# Patient Record
Sex: FEMALE | Race: WHITE | NOT HISPANIC OR LATINO | Employment: UNEMPLOYED | URBAN - METROPOLITAN AREA
[De-identification: names, ages, dates, MRNs, and addresses within clinical notes are randomized per-mention and may not be internally consistent; named-entity substitution may affect disease eponyms.]

---

## 2021-04-16 ENCOUNTER — OFFICE VISIT (OUTPATIENT)
Dept: FAMILY MEDICINE CLINIC | Facility: CLINIC | Age: 41
End: 2021-04-16

## 2021-04-16 VITALS
DIASTOLIC BLOOD PRESSURE: 72 MMHG | RESPIRATION RATE: 14 BRPM | TEMPERATURE: 98 F | HEART RATE: 80 BPM | SYSTOLIC BLOOD PRESSURE: 122 MMHG | HEIGHT: 67 IN | WEIGHT: 124 LBS | BODY MASS INDEX: 19.46 KG/M2 | OXYGEN SATURATION: 98 %

## 2021-04-16 DIAGNOSIS — F41.9 ANXIETY: Primary | ICD-10-CM

## 2021-04-16 DIAGNOSIS — G62.9 NEUROPATHY: ICD-10-CM

## 2021-04-16 DIAGNOSIS — Z13.220 SCREENING FOR HYPERLIPIDEMIA: ICD-10-CM

## 2021-04-16 DIAGNOSIS — F33.1 MODERATE EPISODE OF RECURRENT MAJOR DEPRESSIVE DISORDER (HCC): ICD-10-CM

## 2021-04-16 DIAGNOSIS — Z11.59 NEED FOR HEPATITIS C SCREENING TEST: ICD-10-CM

## 2021-04-16 PROBLEM — G43.909 MIGRAINE: Status: ACTIVE | Noted: 2021-04-16

## 2021-04-16 PROCEDURE — 99204 OFFICE O/P NEW MOD 45 MIN: CPT | Performed by: FAMILY MEDICINE

## 2021-04-16 RX ORDER — SERTRALINE HYDROCHLORIDE 25 MG/1
25 TABLET, FILM COATED ORAL DAILY
Qty: 90 TABLET | Refills: 1 | Status: SHIPPED | OUTPATIENT
Start: 2021-04-16 | End: 2021-05-14 | Stop reason: SDUPTHER

## 2021-04-16 RX ORDER — HYDROXYZINE HYDROCHLORIDE 25 MG/1
12.5-25 TABLET, FILM COATED ORAL EVERY 6 HOURS PRN
Qty: 30 TABLET | Refills: 2 | Status: SHIPPED | OUTPATIENT
Start: 2021-04-16

## 2021-04-16 RX ORDER — MULTIPLE VITAMINS W/ MINERALS TAB 9MG-400MCG
1 TAB ORAL DAILY
COMMUNITY
End: 2021-08-09
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
## 2021-04-23 ENCOUNTER — LAB (OUTPATIENT)
Dept: FAMILY MEDICINE CLINIC | Facility: CLINIC | Age: 41
End: 2021-04-23

## 2021-04-23 DIAGNOSIS — F41.9 ANXIETY: ICD-10-CM

## 2021-04-23 DIAGNOSIS — Z11.59 NEED FOR HEPATITIS C SCREENING TEST: ICD-10-CM

## 2021-04-23 DIAGNOSIS — G62.9 NEUROPATHY: ICD-10-CM

## 2021-04-23 DIAGNOSIS — Z13.220 SCREENING FOR HYPERLIPIDEMIA: ICD-10-CM

## 2021-04-24 LAB
ALBUMIN SERPL-MCNC: 4.5 G/DL (ref 3.5–5.2)
ALBUMIN/GLOB SERPL: 1.7 G/DL
ALP SERPL-CCNC: 31 U/L (ref 39–117)
ALT SERPL-CCNC: 17 U/L (ref 1–33)
AST SERPL-CCNC: 15 U/L (ref 1–32)
BASOPHILS # BLD AUTO: 0.02 10*3/MM3 (ref 0–0.2)
BASOPHILS NFR BLD AUTO: 0.3 % (ref 0–1.5)
BILIRUB SERPL-MCNC: 0.7 MG/DL (ref 0–1.2)
BUN SERPL-MCNC: 12 MG/DL (ref 6–20)
BUN/CREAT SERPL: 15.6 (ref 7–25)
CALCIUM SERPL-MCNC: 9.8 MG/DL (ref 8.6–10.5)
CHLORIDE SERPL-SCNC: 105 MMOL/L (ref 98–107)
CHOLEST SERPL-MCNC: 135 MG/DL (ref 0–200)
CHOLEST/HDLC SERPL: 1.67 {RATIO}
CO2 SERPL-SCNC: 25.3 MMOL/L (ref 22–29)
CREAT SERPL-MCNC: 0.77 MG/DL (ref 0.57–1)
EOSINOPHIL # BLD AUTO: 0.32 10*3/MM3 (ref 0–0.4)
EOSINOPHIL NFR BLD AUTO: 5.5 % (ref 0.3–6.2)
ERYTHROCYTE [DISTWIDTH] IN BLOOD BY AUTOMATED COUNT: 15 % (ref 12.3–15.4)
GLOBULIN SER CALC-MCNC: 2.6 GM/DL
GLUCOSE SERPL-MCNC: 80 MG/DL (ref 65–99)
HCT VFR BLD AUTO: 40.1 % (ref 34–46.6)
HCV AB S/CO SERPL IA: <0.1 S/CO RATIO (ref 0–0.9)
HDLC SERPL-MCNC: 81 MG/DL (ref 40–60)
HGB BLD-MCNC: 13.1 G/DL (ref 12–15.9)
IMM GRANULOCYTES # BLD AUTO: 0.02 10*3/MM3 (ref 0–0.05)
IMM GRANULOCYTES NFR BLD AUTO: 0.3 % (ref 0–0.5)
LDLC SERPL CALC-MCNC: 44 MG/DL (ref 0–100)
LYMPHOCYTES # BLD AUTO: 1.04 10*3/MM3 (ref 0.7–3.1)
LYMPHOCYTES NFR BLD AUTO: 17.9 % (ref 19.6–45.3)
MCH RBC QN AUTO: 30.2 PG (ref 26.6–33)
MCHC RBC AUTO-ENTMCNC: 32.7 G/DL (ref 31.5–35.7)
MCV RBC AUTO: 92.4 FL (ref 79–97)
MONOCYTES # BLD AUTO: 0.66 10*3/MM3 (ref 0.1–0.9)
MONOCYTES NFR BLD AUTO: 11.4 % (ref 5–12)
NEUTROPHILS # BLD AUTO: 3.75 10*3/MM3 (ref 1.7–7)
NEUTROPHILS NFR BLD AUTO: 64.6 % (ref 42.7–76)
NRBC BLD AUTO-RTO: 0 /100 WBC (ref 0–0.2)
PLATELET # BLD AUTO: 216 10*3/MM3 (ref 140–450)
POTASSIUM SERPL-SCNC: 4.8 MMOL/L (ref 3.5–5.2)
PROT SERPL-MCNC: 7.1 G/DL (ref 6–8.5)
RBC # BLD AUTO: 4.34 10*6/MM3 (ref 3.77–5.28)
SODIUM SERPL-SCNC: 139 MMOL/L (ref 136–145)
TRIGL SERPL-MCNC: 40 MG/DL (ref 0–150)
TSH SERPL DL<=0.005 MIU/L-ACNC: 1.87 UIU/ML (ref 0.27–4.2)
VIT B12 SERPL-MCNC: 976 PG/ML (ref 211–946)
VLDLC SERPL CALC-MCNC: 10 MG/DL (ref 5–40)
WBC # BLD AUTO: 5.81 10*3/MM3 (ref 3.4–10.8)

## 2021-04-26 ENCOUNTER — TELEPHONE (OUTPATIENT)
Dept: FAMILY MEDICINE CLINIC | Facility: CLINIC | Age: 41
End: 2021-04-26

## 2021-05-14 ENCOUNTER — OFFICE VISIT (OUTPATIENT)
Dept: FAMILY MEDICINE CLINIC | Facility: CLINIC | Age: 41
End: 2021-05-14

## 2021-05-14 VITALS
WEIGHT: 124 LBS | OXYGEN SATURATION: 99 % | HEART RATE: 76 BPM | DIASTOLIC BLOOD PRESSURE: 64 MMHG | RESPIRATION RATE: 14 BRPM | BODY MASS INDEX: 19.46 KG/M2 | SYSTOLIC BLOOD PRESSURE: 102 MMHG | TEMPERATURE: 98 F | HEIGHT: 67 IN

## 2021-05-14 DIAGNOSIS — R11.0 NAUSEA: ICD-10-CM

## 2021-05-14 DIAGNOSIS — F33.1 MODERATE EPISODE OF RECURRENT MAJOR DEPRESSIVE DISORDER (HCC): ICD-10-CM

## 2021-05-14 DIAGNOSIS — F41.9 ANXIETY: ICD-10-CM

## 2021-05-14 DIAGNOSIS — J06.9 ACUTE URI: Primary | ICD-10-CM

## 2021-05-14 DIAGNOSIS — R05.9 COUGH: ICD-10-CM

## 2021-05-14 LAB
EXPIRATION DATE: NORMAL
FLUAV AG NPH QL: NEGATIVE
FLUBV AG NPH QL: NEGATIVE
INTERNAL CONTROL: NORMAL
Lab: 2780

## 2021-05-14 PROCEDURE — 87804 INFLUENZA ASSAY W/OPTIC: CPT | Performed by: FAMILY MEDICINE

## 2021-05-14 PROCEDURE — 99214 OFFICE O/P EST MOD 30 MIN: CPT | Performed by: FAMILY MEDICINE

## 2021-05-14 RX ORDER — DEXTROMETHORPHAN HYDROBROMIDE AND PROMETHAZINE HYDROCHLORIDE 15; 6.25 MG/5ML; MG/5ML
5 SYRUP ORAL 4 TIMES DAILY PRN
Qty: 180 ML | Refills: 0 | Status: SHIPPED | OUTPATIENT
Start: 2021-05-14 | End: 2021-08-09

## 2021-05-14 RX ORDER — ONDANSETRON 4 MG/1
4 TABLET, ORALLY DISINTEGRATING ORAL EVERY 8 HOURS PRN
Qty: 20 TABLET | Refills: 0 | Status: SHIPPED | OUTPATIENT
Start: 2021-05-14 | End: 2021-08-09

## 2021-05-14 NOTE — PATIENT INSTRUCTIONS
Go to the nearest ER or return to clinic if symptoms worsen, fever/chill develop  Sertraline tablets  What is this medicine?  SERTRALINE (SER tra elizabeth) is used to treat depression. It may also be used to treat obsessive compulsive disorder, panic disorder, post-trauma stress, premenstrual dysphoric disorder (PMDD) or social anxiety.  This medicine may be used for other purposes; ask your health care provider or pharmacist if you have questions.  COMMON BRAND NAME(S): Zoloft  What should I tell my health care provider before I take this medicine?  They need to know if you have any of these conditions:  · bleeding disorders  · bipolar disorder or a family history of bipolar disorder  · glaucoma  · heart disease  · high blood pressure  · history of irregular heartbeat  · history of low levels of calcium, magnesium, or potassium in the blood  · if you often drink alcohol  · liver disease  · receiving electroconvulsive therapy  · seizures  · suicidal thoughts, plans, or attempt; a previous suicide attempt by you or a family member  · take medicines that treat or prevent blood clots  · thyroid disease  · an unusual or allergic reaction to sertraline, other medicines, foods, dyes, or preservatives  · pregnant or trying to get pregnant  · breast-feeding  How should I use this medicine?  Take this medicine by mouth with a glass of water. Follow the directions on the prescription label. You can take it with or without food. Take your medicine at regular intervals. Do not take your medicine more often than directed. Do not stop taking this medicine suddenly except upon the advice of your doctor. Stopping this medicine too quickly may cause serious side effects or your condition may worsen.  A special MedGuide will be given to you by the pharmacist with each prescription and refill. Be sure to read this information carefully each time.  Talk to your pediatrician regarding the use of this medicine in children. While this drug  may be prescribed for children as young as 7 years for selected conditions, precautions do apply.  Overdosage: If you think you have taken too much of this medicine contact a poison control center or emergency room at once.  NOTE: This medicine is only for you. Do not share this medicine with others.  What if I miss a dose?  If you miss a dose, take it as soon as you can. If it is almost time for your next dose, take only that dose. Do not take double or extra doses.  What may interact with this medicine?  Do not take this medicine with any of the following medications:  · cisapride  · dronedarone  · linezolid  · MAOIs like Carbex, Eldepryl, Marplan, Nardil, and Parnate  · methylene blue (injected into a vein)  · pimozide  · thioridazine  This medicine may also interact with the following medications:  · alcohol  · amphetamines  · aspirin and aspirin-like medicines  · certain medicines for depression, anxiety, or psychotic disturbances  · certain medicines for fungal infections like ketoconazole, fluconazole, posaconazole, and itraconazole  · certain medicines for irregular heart beat like flecainide, quinidine, propafenone  · certain medicines for migraine headaches like almotriptan, eletriptan, frovatriptan, naratriptan, rizatriptan, sumatriptan, zolmitriptan  · certain medicines for sleep  · certain medicines for seizures like carbamazepine, valproic acid, phenytoin  · certain medicines that treat or prevent blood clots like warfarin, enoxaparin, dalteparin  · cimetidine  · digoxin  · diuretics  · fentanyl  · isoniazid  · lithium  · NSAIDs, medicines for pain and inflammation, like ibuprofen or naproxen  · other medicines that prolong the QT interval (cause an abnormal heart rhythm) like dofetilide  · rasagiline  · safinamide  · supplements like Terry's wort, kava kava, valerian  · tolbutamide  · tramadol  · tryptophan  This list may not describe all possible interactions. Give your health care provider a list  of all the medicines, herbs, non-prescription drugs, or dietary supplements you use. Also tell them if you smoke, drink alcohol, or use illegal drugs. Some items may interact with your medicine.  What should I watch for while using this medicine?  Tell your doctor if your symptoms do not get better or if they get worse. Visit your doctor or health care professional for regular checks on your progress. Because it may take several weeks to see the full effects of this medicine, it is important to continue your treatment as prescribed by your doctor.  Patients and their families should watch out for new or worsening thoughts of suicide or depression. Also watch out for sudden changes in feelings such as feeling anxious, agitated, panicky, irritable, hostile, aggressive, impulsive, severely restless, overly excited and hyperactive, or not being able to sleep. If this happens, especially at the beginning of treatment or after a change in dose, call your health care professional.  You may get drowsy or dizzy. Do not drive, use machinery, or do anything that needs mental alertness until you know how this medicine affects you. Do not stand or sit up quickly, especially if you are an older patient. This reduces the risk of dizzy or fainting spells. Alcohol may interfere with the effect of this medicine. Avoid alcoholic drinks.  Your mouth may get dry. Chewing sugarless gum or sucking hard candy, and drinking plenty of water may help. Contact your doctor if the problem does not go away or is severe.  What side effects may I notice from receiving this medicine?  Side effects that you should report to your doctor or health care professional as soon as possible:  · allergic reactions like skin rash, itching or hives, swelling of the face, lips, or tongue  · anxious  · black, tarry stools  · changes in vision  · confusion  · elevated mood, decreased need for sleep, racing thoughts, impulsive behavior  · eye pain  · fast,  irregular heartbeat  · feeling faint or lightheaded, falls  · feeling agitated, angry, or irritable  · hallucination, loss of contact with reality  · loss of balance or coordination  · loss of memory  · painful or prolonged erections  · restlessness, pacing, inability to keep still  · seizures  · stiff muscles  · suicidal thoughts or other mood changes  · trouble sleeping  · unusual bleeding or bruising  · unusually weak or tired  · vomiting  Side effects that usually do not require medical attention (report to your doctor or health care professional if they continue or are bothersome):  · change in appetite or weight  · change in sex drive or performance  · diarrhea  · increased sweating  · indigestion, nausea  · tremors  This list may not describe all possible side effects. Call your doctor for medical advice about side effects. You may report side effects to FDA at 9-873-FDA-7967.  Where should I keep my medicine?  Keep out of the reach of children.  Store at room temperature between 15 and 30 degrees C (59 and 86 degrees F). Throw away any unused medicine after the expiration date.  NOTE: This sheet is a summary. It may not cover all possible information. If you have questions about this medicine, talk to your doctor, pharmacist, or health care provider.  © 2021 [employer]vier/Gold Standard (2019-12-10 10:09:27)

## 2021-05-14 NOTE — PROGRESS NOTES
"Chief Complaint  1mo f/u Zoloft ('working but not like I want it to' ) and Congestion, voice comes/goes (chills w/ vomiting Monday-work wants her tested for Covid)    Subjective          Brenda Parsons presents to [facility]  URI   This is a new problem. The current episode started in the past 7 days (4 days). The problem has been unchanged. Associated symptoms include congestion, coughing (dry), headaches (sinus), nausea and vomiting. Associated symptoms comments: Chills, voice hoarseness, altered taste  . She has tried NSAIDs for the symptoms. The treatment provided no relief.   Vomiting has stopped, but nausea remains    Anxiety and depression  Chronic conditions, recently started Sertraline to treat. Since starting sertraline, she has noticed improvement of anxiety and mood. However, still has both anxiety and depression symptoms and not completely controlled. Still cries at times and has to be encouraged to leave home or do things with her boyfriend leaving their residence.   There have not be any negative side effects associated with sertraline.          The following portions of the patient's history were reviewed and updated as appropriate: allergies, current medications, past family history, past medical history, past social history, past surgical history and problem list.    Objective   Vital Signs:   /64   Pulse 76   Temp 98 °F (36.7 °C)   Resp 14   Ht 170.2 cm (67\")   Wt 56.2 kg (124 lb)   SpO2 99%   BMI 19.42 kg/m²     Physical Exam  Vitals and nursing note reviewed.   Constitutional:       Appearance: She is well-developed.   HENT:      Head: Normocephalic and atraumatic.      Right Ear: Hearing, tympanic membrane, ear canal and external ear normal.      Left Ear: Hearing, tympanic membrane, ear canal and external ear normal.      Nose: Nose normal.      Mouth/Throat:      Lips: Pink.      Mouth: Mucous membranes are moist.      Pharynx: Oropharynx is " clear. No oropharyngeal exudate or posterior oropharyngeal erythema.   Eyes:      Conjunctiva/sclera: Conjunctivae normal.   Cardiovascular:      Rate and Rhythm: Normal rate and regular rhythm.      Heart sounds: Normal heart sounds. No murmur heard.     Pulmonary:      Effort: Pulmonary effort is normal.      Breath sounds: Normal breath sounds. No wheezing.   Musculoskeletal:         General: No deformity.      Cervical back: Neck supple.   Skin:     General: Skin is warm and dry.   Neurological:      General: No focal deficit present.      Mental Status: She is alert and oriented to person, place, and time.   Psychiatric:         Mood and Affect: Mood normal.         Behavior: Behavior normal.         Thought Content: Thought content normal.        Result Review :                 Assessment and Plan    Diagnoses and all orders for this visit:    1. Acute URI (Primary)  Comments:  No evidence of bacterial infection. Influenza A/B negative. Covid19 screening is pending.   Orders:  -     COVID-19,[facility] ROUTINE, NP/OP SWAB IN TRANSPORT MEDIA OR ESWAB 72 HR TAT - Swab, Nasopharynx  -     POCT Influenza A/B    2. Nausea  -     promethazine-dextromethorphan (PROMETHAZINE-DM) 6.25-15 MG/5ML syrup; Take 5 mL by mouth 4 (Four) Times a Day As Needed for Cough.  Dispense: 180 mL; Refill: 0  -     ondansetron ODT (Zofran ODT) 4 MG disintegrating tablet; Place 1 tablet on the tongue Every 8 (Eight) Hours As Needed for Nausea or Vomiting.  Dispense: 20 tablet; Refill: 0    3. Cough  -     promethazine-dextromethorphan (PROMETHAZINE-DM) 6.25-15 MG/5ML syrup; Take 5 mL by mouth 4 (Four) Times a Day As Needed for Cough.  Dispense: 180 mL; Refill: 0    4. Anxiety  Comments:  Increase sertraline to 50 mg daily to improve anxiety and depression. She has already seen improvement of symptoms with treatment.   Orders:  -     sertraline (Zoloft) 50 MG tablet; Take 1 tablet by mouth Daily.  Dispense: 30 tablet; Refill: 1    5. Moderate  episode of recurrent major depressive disorder (CMS/HCC)  -     sertraline (Zoloft) 50 MG tablet; Take 1 tablet by mouth Daily.  Dispense: 30 tablet; Refill: 1        Follow Up   Return in about 3 months (around 8/14/2021).  Patient was given instructions and counseling regarding her condition or for health maintenance advice. Please see specific information pulled into the AVS if appropriate.

## 2021-05-27 ENCOUNTER — APPOINTMENT (OUTPATIENT)
Dept: NEUROLOGY | Facility: HOSPITAL | Age: 41
End: 2021-05-27

## 2021-05-27 ENCOUNTER — HOSPITAL ENCOUNTER (OUTPATIENT)
Dept: NEUROLOGY | Facility: HOSPITAL | Age: 41
Discharge: HOME | End: 2021-05-27
Admitting: FAMILY MEDICINE

## 2021-05-27 PROCEDURE — 95913 NRV CNDJ TEST 13/> STUDIES: CPT

## 2021-05-27 PROCEDURE — 95886 MUSC TEST DONE W/N TEST COMP: CPT

## 2021-05-31 DIAGNOSIS — G56.21 ULNAR NEUROPATHY AT ELBOW OF RIGHT UPPER EXTREMITY: Primary | ICD-10-CM

## 2021-06-03 DIAGNOSIS — R20.2 PARESTHESIA OF BOTH FEET: Primary | ICD-10-CM

## 2021-06-11 DIAGNOSIS — F41.9 ANXIETY: ICD-10-CM

## 2021-06-11 DIAGNOSIS — F33.1 MODERATE EPISODE OF RECURRENT MAJOR DEPRESSIVE DISORDER (HCC): ICD-10-CM

## 2021-06-17 ENCOUNTER — TELEPHONE (OUTPATIENT)
Dept: FAMILY MEDICINE CLINIC | Facility: CLINIC | Age: 41
End: 2021-06-17

## 2021-06-17 NOTE — TELEPHONE ENCOUNTER
Request for Information Form complete. Does pt want to p/u or have us fax it?     Lvm for pt. HUB CAN ASK please.

## 2021-06-24 ENCOUNTER — OFFICE VISIT (OUTPATIENT)
Dept: ORTHOPEDIC SURGERY | Facility: CLINIC | Age: 41
End: 2021-06-24

## 2021-06-24 VITALS
DIASTOLIC BLOOD PRESSURE: 78 MMHG | WEIGHT: 112 LBS | BODY MASS INDEX: 17.58 KG/M2 | HEIGHT: 67 IN | HEART RATE: 82 BPM | SYSTOLIC BLOOD PRESSURE: 126 MMHG

## 2021-06-24 DIAGNOSIS — G56.21 ULNAR NEUROPATHY AT ELBOW, RIGHT: Primary | ICD-10-CM

## 2021-06-24 PROCEDURE — 99204 OFFICE O/P NEW MOD 45 MIN: CPT | Performed by: PHYSICIAN ASSISTANT

## 2021-06-24 RX ORDER — ACETAMINOPHEN 500 MG
500 TABLET ORAL AS NEEDED
COMMUNITY
End: 2021-08-09

## 2021-06-24 RX ORDER — IBUPROFEN 200 MG
200 TABLET ORAL AS NEEDED
COMMUNITY
End: 2021-08-09

## 2021-06-24 RX ORDER — MELOXICAM 15 MG/1
15 TABLET ORAL DAILY
Qty: 30 TABLET | Refills: 2 | Status: SHIPPED | OUTPATIENT
Start: 2021-06-24 | End: 2021-08-09

## 2021-06-24 NOTE — PROGRESS NOTES
[facility]    Subjective     Patient: Brenda Parsons  : 1980    Primary Care Provider: Alice Batista DO    Requesting Provider: As above    Pain of the Right Elbow      History    Chief Complaint: Right elbow pain with hand numbness and tingling    History of Present Illness: This very pleasant 40-year-old female presenting today to discuss her right elbow pain and hand numbness and tingling for 2 to 3 years intermittently.  She is right-hand dominant.  She denies any trauma or injury.  She reports symptoms in the ring and pinky finger and sometimes in the long finger.  It bothers her more at work and in [employer].  It sometimes wakes her at night.  She has treated with ibuprofen.  She had EMG on 2021 here.  She reports the same but milder symptoms on the left. here for further evaluation treatment recommendations.    Current Outpatient Medications on File Prior to Visit   Medication Sig Dispense Refill   • acetaminophen (TYLENOL) 500 MG tablet Take 500 mg by mouth As Needed for Mild Pain .     • Aspirin-Acetaminophen-Caffeine (EXCEDRIN MIGRAINE PO) Take  by mouth As Needed.     • hydrOXYzine (ATARAX) 25 MG tablet Take 0.5-1 tablets by mouth Every 6 (Six) Hours As Needed for Anxiety. 30 tablet 2   • ibuprofen (ADVIL,MOTRIN) 200 MG tablet Take 200 mg by mouth As Needed for Mild Pain .     • multivitamin with minerals (MULTIVITAMIN ADULT PO) Take 1 tablet by mouth Daily.     • ondansetron ODT (Zofran ODT) 4 MG disintegrating tablet Place 1 tablet on the tongue Every 8 (Eight) Hours As Needed for Nausea or Vomiting. 20 tablet 0   • promethazine-dextromethorphan (PROMETHAZINE-DM) 6.25-15 MG/5ML syrup Take 5 mL by mouth 4 (Four) Times a Day As Needed for Cough. 180 mL 0   • sertraline (Zoloft) 50 MG tablet Take 1 tablet by mouth Daily. 90 tablet 1     No current facility-administered medications on file prior to visit.      Allergies   Allergen Reactions   • Grass Itching       Past Medical History:   Diagnosis Date   • Back problem    • Bronchitis    • Claustrophobia    • Depression    • Migraine    • Pneumonia    • Shingles      Past Surgical History:   Procedure Laterality Date   • ADENOIDECTOMY     • GYNECOLOGIC CRYOSURGERY     • INDUCED      • TONSILLECTOMY     • WISDOM TOOTH EXTRACTION       Family History   Problem Relation Age of Onset   • Thyroid disease Mother    • Heart disease Father    • Hyperlipidemia Father    • Hypertension Father    • Mental illness Sister       Social History     Socioeconomic History   • Marital status: Single     Spouse name: Not on file   • Number of children: Not on file   • Years of education: Not on file   • Highest education level: Not on file   Tobacco Use   • Smoking status: Former Smoker     Packs/day: 0.25     Years: 16.00     Pack years: 4.00     Types: Cigarettes     Quit date:      Years since quittin.4   • Smokeless tobacco: Never Used   Vaping Use   • Vaping Use: Never used   Substance and Sexual Activity   • Alcohol use: Yes     Comment: 1-2 a week    • Drug use: Never   • Sexual activity: Defer     Comment:         Review of Systems   Constitutional: Positive for appetite change and unexpected weight change.   Eyes: Negative.    Respiratory: Positive for shortness of breath.    Cardiovascular: Negative.    Gastrointestinal: Negative.    Endocrine: Negative.    Genitourinary: Negative.    Musculoskeletal: Positive for arthralgias and joint swelling.   Skin: Negative.    Allergic/Immunologic: Negative.    Neurological: Positive for dizziness, tremors, light-headedness, numbness (Bilat hands) and headaches.   Hematological: Negative.    Psychiatric/Behavioral: Positive for agitation, decreased concentration and sleep disturbance. The patient is nervous/anxious.        The following portions of the patient's history were reviewed and updated as appropriate: allergies, current medications, past family history,  "past medical history, past social history, past surgical history and problem list.      Objective      Physical Exam  /78   Pulse 82   Ht 170.2 cm (67.01\")   Wt 50.8 kg (112 lb)   BMI 17.54 kg/m²     Body mass index is 17.54 kg/m².    GENERAL: Body habitus: normal weight for height    Gait: normal     Mental Status:  awake and alert; oriented to person, place, and time    Voice:  clear  SKIN:  Normal    Hair Growth:  Right:normal; Left:  normal  HEENT: Head: Normocephalic, atraumatic,  without obvious abnormality.  eye: normal external eye, no icterus   PULM:  Repiratory effort normal    Ortho Exam   Peripheral Vascular   Right Upper Extremity    No cyanotic nail beds    Pink nail beds and rapid capillary refill   Palpation    Radial Pulse - Bilaterally normal    Neurologic   Sensory - Elbow   Inspection and Palpation:    Light touch: intact - right hand   Muscle Strength and Tone:    Right bicep - 5/5    Right tricep - 5/5    Right wrist extensors - 5/5     Right wrist flexors - 5/5    Right intrinsics - 5/5    Musculoskeletal   Right Upper Extremity - Elbow   Inspection and Palpation     Tenderness -nontender    Effusion - none    Crepitus - none   Range of Motion    Flexion: AROM -140 degrees    Extension - AROM - 0 degrees     Forearm supination: AROM - 90 degrees    Forearm pronation - AROM - 90 degrees    Deformities/Malalignments/Discepancies - non   Functional testing:    Valgus stress test negative    Varus stress test negative    Elbow flexion test negative    Tinel's sign at Cubital tunnel positive peripheral Vascular   Left Upper Extremity    No cyanotic nail beds    Pink nail beds and rapid capillary refill   Palpation    Radial Pulse - Bilaterally normal    Neurologic   Sensory - Elbow   Inspection and Palpation:    Light touch: intact - right hand   Muscle Strength and Tone:    Left bicep - 5/5    Left tricep - 5/5    Left wrist extensors - 5/5     Left wrist flexors - 5/5    Left intrinsics " - 5/5    Musculoskeletal   Left Upper Extremity - Elbow   Inspection and Palpation     Tenderness -nontender    Effusion - none    Crepitus - none   Range of Motion    Flexion: AROM - 140 degrees    Extension - AROM - 0 degrees     Forearm supination: AROM - 90 degrees    Forearm pronation - AROM - 90 degrees   Deformities/Malalignments/Discepancies - none   Functional testing:    Valgus stress test negative    Varus stress test negative    Elbow flexion test negative    Tinel's sign at Cubital tunnel positive      Medical Decision Making    Data Review:   Reviewed right upper extremity EMG from 5/27/2021    Assessment:  1. Ulnar neuropathy at elbow, right        Plan:  Right mild ulnar neuropathy.  I reviewed EMG from 5/27/2021 and clinical findings with the patient.  On exam, she has positive Tinel's at the elbow with normal range of motion and strength.  EMG shows mild right ulnar neuropathy.  We discussed treatment including night splinting straight as well as of regular oral anti-inflammatory.  I also recommended a topical anti-inflammatory.  Last resort, would be surgical ulnar nerve transposition.  I have given her a picture of a night splint to get to begin using.  I given a prescription for meloxicam.  She will return to see me in 6 to 8 weeks or sooner if needed.    History, diagnosis and treatment plan discussed with Dr. Sims.          Phuong Guadalupe PA-C  06/24/21  13:59 EDT

## 2021-07-23 DIAGNOSIS — F41.9 ANXIETY: ICD-10-CM

## 2021-07-23 DIAGNOSIS — F33.1 MODERATE EPISODE OF RECURRENT MAJOR DEPRESSIVE DISORDER (HCC): ICD-10-CM

## 2021-08-09 ENCOUNTER — OFFICE VISIT (OUTPATIENT)
Dept: FAMILY MEDICINE CLINIC | Facility: CLINIC | Age: 41
End: 2021-08-09

## 2021-08-09 VITALS
HEART RATE: 76 BPM | WEIGHT: 117 LBS | BODY MASS INDEX: 18.36 KG/M2 | RESPIRATION RATE: 16 BRPM | SYSTOLIC BLOOD PRESSURE: 112 MMHG | TEMPERATURE: 97.7 F | DIASTOLIC BLOOD PRESSURE: 66 MMHG | OXYGEN SATURATION: 99 % | HEIGHT: 67 IN

## 2021-08-09 DIAGNOSIS — R20.2 PARESTHESIA AND PAIN OF BOTH UPPER EXTREMITIES: ICD-10-CM

## 2021-08-09 DIAGNOSIS — M79.602 PARESTHESIA AND PAIN OF BOTH UPPER EXTREMITIES: ICD-10-CM

## 2021-08-09 DIAGNOSIS — F41.9 ANXIETY: ICD-10-CM

## 2021-08-09 DIAGNOSIS — R20.2 PARESTHESIA OF BOTH LOWER EXTREMITIES: ICD-10-CM

## 2021-08-09 DIAGNOSIS — F33.1 MODERATE EPISODE OF RECURRENT MAJOR DEPRESSIVE DISORDER (HCC): Primary | ICD-10-CM

## 2021-08-09 DIAGNOSIS — M79.601 PARESTHESIA AND PAIN OF BOTH UPPER EXTREMITIES: ICD-10-CM

## 2021-08-09 DIAGNOSIS — R19.7 DIARRHEA, UNSPECIFIED TYPE: ICD-10-CM

## 2021-08-09 PROCEDURE — 99214 OFFICE O/P EST MOD 30 MIN: CPT | Performed by: FAMILY MEDICINE

## 2021-08-09 RX ORDER — VENLAFAXINE HYDROCHLORIDE 75 MG/1
75 CAPSULE, EXTENDED RELEASE ORAL DAILY
Qty: 30 CAPSULE | Refills: 2 | Status: SHIPPED | OUTPATIENT
Start: 2021-08-09

## 2021-08-09 RX ORDER — BUSPIRONE HYDROCHLORIDE 7.5 MG/1
7.5 TABLET ORAL 2 TIMES DAILY PRN
Qty: 60 TABLET | Refills: 1 | Status: SHIPPED | OUTPATIENT
Start: 2021-08-09

## 2021-08-09 NOTE — PROGRESS NOTES
"Chief Complaint  3mo f/u anxiety; Diarrhea after eating (Tried BRAT diet, didn't help ); and More snappy, increase Zoloft? (Really bad crying spells)    Subjective          Brenda Parsons presents to [facility]  History of Present Illness  Anxiety and Depression  Chronic condition, currently treated with sertraline.   Stopped working at [employer] because it was making anxiety worse.  At this time, she is not working due to anxiety and depression.  Finds herself more emotional and irritable.  Sertraline was initially helping, but over the last 2 weeks, anxiety has worsened.   Having panic attacks often. Hydroxyzine is not effective with panic attacks, takes too long to work.     Diarrhea associated with eating, worse within the last 2 weeks.  No specific food makes symptoms worse, occurs with all foods.  Abdominal cramping present  Anxiety does make diarrhea worse.     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  She did see orthopedic office for management of this.  She is scheduled with neurology in the near future for further evaluation.  No prior imaging of brain.      The following portions of the patient's history were reviewed and updated as appropriate: allergies, current medications, past family history, past medical history, past social history, past surgical history and problem list.    Objective   Vital Signs:   /66   Pulse 76   Temp 97.7 °F (36.5 °C)   Resp 16   Ht 170.2 cm (67\")   Wt 53.1 kg (117 lb)   SpO2 99%   BMI 18.32 kg/m²     Physical Exam  Vitals and nursing note reviewed.   Constitutional:       Appearance: She is well-developed.   HENT:      Head: Normocephalic and atraumatic.      Right Ear: External ear normal.      Left Ear: External ear normal.      Nose: Nose normal.   Eyes:      Conjunctiva/sclera: Conjunctivae normal.   Cardiovascular:      Rate " and Rhythm: Normal rate and regular rhythm.      Heart sounds: Normal heart sounds. No murmur heard.     Pulmonary:      Effort: Pulmonary effort is normal.      Breath sounds: Normal breath sounds.   Abdominal:      Palpations: Abdomen is soft.      Tenderness: There is abdominal tenderness in the right upper quadrant. There is no guarding. Positive signs include Esteban's sign.   Musculoskeletal:         General: No deformity.      Cervical back: Neck supple.   Skin:     General: Skin is warm and dry.   Neurological:      Mental Status: She is alert and oriented to person, place, and time.   Psychiatric:         Mood and Affect: Mood is anxious. Affect is flat.         Behavior: Behavior normal.         Thought Content: Thought content normal.        Result Review :                 Assessment and Plan    Diagnoses and all orders for this visit:    1. Moderate episode of recurrent major depressive disorder (CMS/HCC) (Primary)  -     venlafaxine XR (Effexor XR) 75 MG 24 hr capsule; Take 1 capsule by mouth Daily.  Dispense: 30 capsule; Refill: 2  -     Ambulatory Referral to Psychiatry    2. Anxiety  -     venlafaxine XR (Effexor XR) 75 MG 24 hr capsule; Take 1 capsule by mouth Daily.  Dispense: 30 capsule; Refill: 2  -     busPIRone (BUSPAR) 7.5 MG tablet; Take 1 tablet by mouth 2 (Two) Times a Day As Needed (anixety).  Dispense: 60 tablet; Refill: 1  -     Ambulatory Referral to Psychiatry    3. Paresthesia and pain of both upper extremities  -     MRI Brain With & Without Contrast    4. Paresthesia of both lower extremities  -     MRI Brain With & Without Contrast    5. Diarrhea, unspecified type  -     US Gallbladder; Future    Change sertraline to venlafaxine for treatment of anxiety and depression.  Hydroxyzine was not effective for her, will use buspirone as needed for breakthrough anxiety.  She would like to establish care with psychiatry and counselor, referral placed.  MRI brain to evaluate paresthesias of  upper and lower extremities.  Keep appointment with neurology.  Ultrasound of gallbladder to evaluate diarrhea associated with eating.  If this is normal, will refer to gastroenterology for further evaluation.    Follow Up   Return in about 6 weeks (around 9/20/2021) for Annual with pap smear .  Patient was given instructions and counseling regarding her condition or for health maintenance advice. Please see specific information pulled into the AVS if appropriate.

## 2021-09-03 ENCOUNTER — HOSPITAL ENCOUNTER (OUTPATIENT)
Dept: ULTRASOUND IMAGING | Facility: HOSPITAL | Age: 41
Discharge: HOME | End: 2021-09-03

## 2021-09-03 ENCOUNTER — HOSPITAL ENCOUNTER (OUTPATIENT)
Dept: MRI IMAGING | Facility: HOSPITAL | Age: 41
Discharge: HOME | End: 2021-09-03

## 2021-09-03 DIAGNOSIS — R19.7 DIARRHEA, UNSPECIFIED TYPE: ICD-10-CM

## 2021-09-03 PROCEDURE — 70551 MRI BRAIN STEM W/O DYE: CPT

## 2021-09-03 PROCEDURE — 76705 ECHO EXAM OF ABDOMEN: CPT

## 2021-09-16 ENCOUNTER — OFFICE VISIT (OUTPATIENT)
Dept: NEUROLOGY | Facility: CLINIC | Age: 41
End: 2021-09-16

## 2021-09-16 ENCOUNTER — LAB (OUTPATIENT)
Dept: LAB | Facility: HOSPITAL | Age: 41
End: 2021-09-16

## 2021-09-16 VITALS
WEIGHT: 114 LBS | SYSTOLIC BLOOD PRESSURE: 100 MMHG | BODY MASS INDEX: 17.89 KG/M2 | HEART RATE: 70 BPM | DIASTOLIC BLOOD PRESSURE: 62 MMHG | OXYGEN SATURATION: 97 % | HEIGHT: 67 IN

## 2021-09-16 DIAGNOSIS — R20.2 PARESTHESIAS: ICD-10-CM

## 2021-09-16 DIAGNOSIS — M54.17 LUMBOSACRAL RADICULOPATHY: ICD-10-CM

## 2021-09-16 DIAGNOSIS — G56.21 ULNAR NEUROPATHY OF RIGHT UPPER EXTREMITY: Primary | ICD-10-CM

## 2021-09-16 DIAGNOSIS — G43.109 MIGRAINE WITH AURA AND WITHOUT STATUS MIGRAINOSUS, NOT INTRACTABLE: ICD-10-CM

## 2021-09-16 PROCEDURE — 36415 COLL VENOUS BLD VENIPUNCTURE: CPT

## 2021-09-16 PROCEDURE — 86038 ANTINUCLEAR ANTIBODIES: CPT

## 2021-09-16 PROCEDURE — 83516 IMMUNOASSAY NONANTIBODY: CPT

## 2021-09-16 PROCEDURE — 84207 ASSAY OF VITAMIN B-6: CPT

## 2021-09-16 PROCEDURE — 86255 FLUORESCENT ANTIBODY SCREEN: CPT

## 2021-09-16 PROCEDURE — 84155 ASSAY OF PROTEIN SERUM: CPT

## 2021-09-16 PROCEDURE — 82728 ASSAY OF FERRITIN: CPT

## 2021-09-16 PROCEDURE — 84165 PROTEIN E-PHORESIS SERUM: CPT

## 2021-09-16 PROCEDURE — 99204 OFFICE O/P NEW MOD 45 MIN: CPT | Performed by: PSYCHIATRY & NEUROLOGY

## 2021-09-16 PROCEDURE — 82784 ASSAY IGA/IGD/IGG/IGM EACH: CPT

## 2021-09-16 PROCEDURE — 83036 HEMOGLOBIN GLYCOSYLATED A1C: CPT

## 2021-09-16 RX ORDER — RIZATRIPTAN BENZOATE 10 MG/1
TABLET ORAL
Qty: 8 TABLET | Refills: 3 | Status: SHIPPED | OUTPATIENT
Start: 2021-09-16

## 2021-09-16 RX ORDER — ROPINIROLE 0.5 MG/1
TABLET, FILM COATED ORAL
Qty: 30 TABLET | Refills: 2 | Status: SHIPPED | OUTPATIENT
Start: 2021-09-16

## 2021-09-16 NOTE — PROGRESS NOTES
Subjective:    CC: Brenda Botello is seen today in consultation at the request of Alice Batista DO for Paresthesia of Both Feet       HPI:  40-year-old female with a history of anxiety, depression, headaches presents with paresthesias.  As per patient she started having numbness along with a sensation of burning and pins-and-needles in the balls of her right foot about 1 year ago.  Prolonged standing makes the symptoms worse therefore after she quit her job at [employer] recently the symptoms have improved slightly.  Over time she has also had the symptoms in her left foot with numbness in the last 3 digits of both feet with her feet feeling cold.  Occasionally she gets sharp electric-like sensations in her legs and also jerks her legs at night.  Denies any history of diabetes or recent heavy alcohol intake (would occasionally binge drink in her 20s).  In addition she has low back pain that radiates down the back of her right leg.  She also developed numbness in the last 2 digits of her right hand a few months ago.  Today she also complains of frequent headaches that are preceded by auras where she sees flashes of light or starts to lose her peripheral vision.  The headaches are mostly one-sided accompanied by nausea and slurring of speech but no severe photophobia or phonophobia.  They are currently occurring about 3-4 times a month.  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  She also had an EMG/NCS in May that showed mild ulnar neuropathy on the right but no evidence of large fiber neuropathy or radiculopathy either in the right arm or right leg.  Has had a B12 level that was 976 and a normal TSH with normal hemoglobin and blood glucose.  Of note-I personally reviewed her MRI brain    The following portions of the patient's history were reviewed today and updated as of 09/16/2021  : allergies, current  medications, past family history, past medical history, past social history, past surgical history and problem list  These document will be scanned to patient's chart.      Current Outpatient Medications:   •  busPIRone (BUSPAR) 7.5 MG tablet, Take 1 tablet by mouth 2 (Two) Times a Day As Needed (anixety)., Disp: 60 tablet, Rfl: 1  •  hydrOXYzine (ATARAX) 25 MG tablet, Take 0.5-1 tablets by mouth Every 6 (Six) Hours As Needed for Anxiety., Disp: 30 tablet, Rfl: 2  •  venlafaxine XR (Effexor XR) 75 MG 24 hr capsule, Take 1 capsule by mouth Daily., Disp: 30 capsule, Rfl: 2  •  rizatriptan (Maxalt) 10 MG tablet, Take 1 tablet at onset of headache.  May repeat once in 2 hours.  Do not take more than 2 tabs a day or 8 tabs a month, Disp: 8 tablet, Rfl: 3  •  rOPINIRole (Requip) 0.5 MG tablet, Take half a tablet at night for 1 week then increase to 1 tablet at night thereafter.  Take 2 hours before bedtime., Disp: 30 tablet, Rfl: 2   Past Medical History:   Diagnosis Date   • Anxiety    • Back problem    • Bladder problem    • Bowel trouble    • Bronchitis    • Claustrophobia    • Depression    • Depression    • Dizziness    • FH: mental illness    • Gait difficulty    • Insomnia    • Migraine    • Neuropathy    • Numbness and tingling    • Panic attacks    • Pneumonia    • RLS (restless legs syndrome)    • Shingles    • Tension headache    • Weakness       Past Surgical History:   Procedure Laterality Date   • ADENOIDECTOMY     • GYNECOLOGIC CRYOSURGERY     • INDUCED      • TONSILLECTOMY     • WISDOM TOOTH EXTRACTION        Family History   Problem Relation Age of Onset   • Thyroid disease Mother    • Heart disease Father    • Hyperlipidemia Father    • Hypertension Father    • Mental illness Sister    • Alcohol abuse Brother    • Cancer Paternal Aunt    • Cancer Paternal Uncle       Social History     Socioeconomic History   • Marital status: Single     Spouse name: Not on file   • Number of children: Not on  "file   • Years of education: Not on file   • Highest education level: Not on file   Tobacco Use   • Smoking status: Former Smoker     Packs/day: 0.25     Years: 16.00     Pack years: 4.00     Types: Cigarettes     Quit date:      Years since quittin.7   • Smokeless tobacco: Never Used   Vaping Use   • Vaping Use: Never used   Substance and Sexual Activity   • Alcohol use: Yes     Comment: 1-2 a week    • Drug use: Never   • Sexual activity: Defer     Comment:      Review of Systems   Neurological: Positive for numbness and headache.   All other systems reviewed and are negative.      Objective:    /62   Pulse 70   Ht 170.2 cm (67\")   Wt 51.7 kg (114 lb)   LMP 2021   SpO2 97%   Breastfeeding No   BMI 17.85 kg/m²     Neurology Exam:    General apperance: NAD.     Mental status: Alert, awake and oriented to time place and person.    Recent and Remote memory: Intact.    Attention span and Concentration: Normal.     Language and Speech: Intact- No dysarthria.    Fluency, Naming , Repitition and Comprehension:  Intact    Cranial Nerves:   CN II: Visual fields are full. Intact. Fundi - Normal, No papillederma, Pupils - MARLY  CN III, IV and VI: Extraocular movements are intact. Normal saccades.   CN V: Facial sensation is intact.   CN VII: Muscles of facial expression reveal no asymmetry. Intact.   CN VIII: Hearing is intact. Whispered voice intact.   CN IX and X: Palate elevates symmetrically. Intact  CN XI: Shoulder shrug is intact.   CN XII: Tongue is midline without evidence of atrophy or fasciculation.     Ophthalmoscopic exam of optic disc-normal    Motor:  Right UE muscle strength 5/5. Normal tone.     Left UE muscle strength 5/5. Normal tone.      Right LE muscle strength5/5. Normal tone.     Left LE muscle strength 5/5. Normal tone.      Sensory: Mildly reduced to pinprick and temperature in both feet till below ankles.  Intact vibration sense.  Also reduced pinprick in the last " 2 digits of the right hand    DTRs: 2+ bilaterally in upper and lower extremities.    Babinski: Negative bilaterally.    Co-ordination: Normal finger-to-nose, heel to shin B/L.    Rhomberg: Negative.    Gait: Normal.    Cardiovascular: Regular rate and rhythm without murmur, gallop or rub.    Assessment and Plan:  1. Paresthesias  Based on her symptoms she could have mild peripheral neuropathy along with mild RLS.  EMG/NCS did not show any evidence of large fiber neuropathy  I will check the rest of her neuropathy panel  I will start her on Requip gradually increasing to 0.5 mg nightly to see if her symptoms improve especially the sharp electrical sensations in her legs  She is already on Effexor which sometimes helps with neuropathic symptoms    - ЕКАТЕРИНА by IFA, Reflex 9-biomarkers profile; Future  - Hemoglobin A1c; Future  - Celiac Disease Panel; Future  - Protein Elec + Interp, Serum; Future  - Vitamin B6; Future  - Ferritin; Future    2. Ulnar neuropathy of right upper extremity  EMG/NCS showed mild ulnar neuropathy on the right  I have told her to wear a hard elbow splint especially at night    3. Migraine with aura and without status migrainosus, not intractable  Patient has migraines with aura.  As she is only having 3-4 headaches a month we will defer starting her on a prophylactic medication  I will start her on Maxalt 10 mg as needed    4. Lumbosacral radiculopathy  She has symptoms of right lumbosacral radiculopathy  I will give her a PT referral  - Ambulatory Referral to Physical Therapy       Return in about 2 months (around 11/16/2021).     I spent over45 minutes with the patient face to face out of which over 50% (30 minutes) was spent in management, instructions and education.     Sparkle Llanes MD

## 2021-09-17 LAB
ALBUMIN SERPL ELPH-MCNC: 4.2 G/DL (ref 2.9–4.4)
ALBUMIN/GLOB SERPL: 1.1 {RATIO} (ref 0.7–1.7)
ALPHA1 GLOB SERPL ELPH-MCNC: 0.3 G/DL (ref 0–0.4)
ALPHA2 GLOB SERPL ELPH-MCNC: 0.6 G/DL (ref 0.4–1)
B-GLOBULIN SERPL ELPH-MCNC: 1.3 G/DL (ref 0.7–1.3)
ENDOMYSIUM IGA SER QL: NEGATIVE
FERRITIN SERPL-MCNC: 17.5 NG/ML (ref 13–150)
GAMMA GLOB SERPL ELPH-MCNC: 1.6 G/DL (ref 0.4–1.8)
GLOBULIN SER CALC-MCNC: 3.8 G/DL (ref 2.2–3.9)
HBA1C MFR BLD: 4.75 % (ref 4.8–5.6)
IGA SERPL-MCNC: 314 MG/DL (ref 87–352)
LABORATORY COMMENT REPORT: NORMAL
M PROTEIN SERPL ELPH-MCNC: NORMAL G/DL
PROT PATTERN SERPL ELPH-IMP: NORMAL
PROT SERPL-MCNC: 8 G/DL (ref 6–8.5)
TTG IGA SER-ACNC: <2 U/ML (ref 0–3)

## 2021-09-18 LAB
ANA TITR SER IF: NEGATIVE {TITER}
LABORATORY COMMENT REPORT: NORMAL

## 2021-09-25 LAB — VIT B6 SERPL-MCNC: 5.8 UG/L (ref 2–32.8)

## 2021-09-27 ENCOUNTER — TELEPHONE (OUTPATIENT)
Dept: NEUROLOGY | Facility: CLINIC | Age: 41
End: 2021-09-27

## 2021-09-27 NOTE — TELEPHONE ENCOUNTER
Caller: Brenda Botello    Relationship: Self    Best call back number: [phone]    What was the call regarding: PT CALLED STATING SHE RECEIVED A CALL FROM [facility] ([place]) THIS MORNING STATING THEY ARE OUT-OF-NETWORK WITH PT'S INSURANCE, [facility]. PT WOULD LIKE TO KNOW IF DR. PARHAM WOULD BE ABLE TO REFER HER ELSEWHERE. PT WOULD LIKE TO AVOID GOING TO [facility] FOR ANY PHYSICAL THERAPY APPTS.     PT STATES SHE WOULD CONSIDER TRAVELING TO [place] IF NEEDED.    Do you require a callback: YES, PLEASE.    PLEASE REVIEW AND ADVISE.

## 2021-09-27 NOTE — TELEPHONE ENCOUNTER
Called 1x. No answer. I LVM letting patient know that I can send PT order anywhere she likes. If she has a preference or know who her insurance will accept, to contact our office and let me know. I provided my name and call back number awaiting a return call.   -TMT

## 2021-09-28 NOTE — TELEPHONE ENCOUNTER
Physical therapy order faxed to [facility] at [phone], per patient request. Fax successful.   -TMT

## 2021-09-28 NOTE — TELEPHONE ENCOUNTER
PT CALLED BACK. SAYS SHE WOULD LIKE PHYS THER REFERRAL SENT TO [facility]. SAID SHE WAS ORIGINALLY HESITANT TO GO TO THE HOSPITAL FOR PHYSICAL THERAPY BUT IS FINE WITH IT NOW THAT SHE KNOWS IT'S NOT AT THE ACTUAL HOSPITAL - IS INSTEAD AT A SEPARATE SMALLER BUILDING. PLEASE ADVISE